# Patient Record
Sex: MALE | Race: WHITE | Employment: OTHER | URBAN - METROPOLITAN AREA
[De-identification: names, ages, dates, MRNs, and addresses within clinical notes are randomized per-mention and may not be internally consistent; named-entity substitution may affect disease eponyms.]

---

## 2020-07-11 ENCOUNTER — TRANSFERRED RECORDS (OUTPATIENT)
Dept: HEALTH INFORMATION MANAGEMENT | Facility: CLINIC | Age: 70
End: 2020-07-11

## 2020-07-14 ENCOUNTER — TRANSFERRED RECORDS (OUTPATIENT)
Dept: HEALTH INFORMATION MANAGEMENT | Facility: CLINIC | Age: 70
End: 2020-07-14

## 2020-07-15 ENCOUNTER — TRANSFERRED RECORDS (OUTPATIENT)
Dept: HEALTH INFORMATION MANAGEMENT | Facility: CLINIC | Age: 70
End: 2020-07-15

## 2020-07-16 ENCOUNTER — TRANSFERRED RECORDS (OUTPATIENT)
Dept: HEALTH INFORMATION MANAGEMENT | Facility: CLINIC | Age: 70
End: 2020-07-16

## 2020-07-24 ENCOUNTER — TRANSFERRED RECORDS (OUTPATIENT)
Dept: HEALTH INFORMATION MANAGEMENT | Facility: CLINIC | Age: 70
End: 2020-07-24

## 2020-08-27 ENCOUNTER — TRANSFERRED RECORDS (OUTPATIENT)
Dept: HEALTH INFORMATION MANAGEMENT | Facility: CLINIC | Age: 70
End: 2020-08-27

## 2020-09-14 ENCOUNTER — TRANSFERRED RECORDS (OUTPATIENT)
Dept: HEALTH INFORMATION MANAGEMENT | Facility: CLINIC | Age: 70
End: 2020-09-14

## 2020-09-16 ENCOUNTER — MEDICAL CORRESPONDENCE (OUTPATIENT)
Dept: HEALTH INFORMATION MANAGEMENT | Facility: CLINIC | Age: 70
End: 2020-09-16

## 2020-09-17 ENCOUNTER — TRANSCRIBE ORDERS (OUTPATIENT)
Dept: OTHER | Age: 70
End: 2020-09-17

## 2020-09-17 ENCOUNTER — TELEPHONE (OUTPATIENT)
Dept: GASTROENTEROLOGY | Facility: CLINIC | Age: 70
End: 2020-09-17

## 2020-09-17 DIAGNOSIS — K80.50 BILE DUCT STONE: Primary | ICD-10-CM

## 2020-09-17 NOTE — TELEPHONE ENCOUNTER
[facility] Intake form:    Referring/Requesting provider and Health care System:  Dr Jose Angel Rouse at [facility]    GI provider pt seen for initial consult.   Clinic contact - Name, Phone and Fax number: : [phone] Fax [phone]    Requested provider (if specified):   Dr. Chavez    Has patient been evaluated in clinic or had a procedure [facility] provider in the last 5 years: unknown     Indication/Diagnosis for consultation:   Bile Duct /Repeat ERCP    Has patient been evaluated by another Gastroenterologist? Unknown     Imaging completed:     CT scan     unknown  MRI     unknown      Procedures:     Upper Endoscopy/EGD unknown    Endoscopic Ultrasound/EUS unknown    ERCP  Yes    Colonoscopy unknown    Are images able/being pushed to our system? Yes   Is patient aware of request for clinc consultation and ok to be contacted to schedule? Yes    STAT

## 2020-09-18 NOTE — TELEPHONE ENCOUNTER
Records and images not in pt's chart.   Returned call to Dr Jose Angel Rouse at [facility]   Medical records sending ERCP operative report and related images  Will be faxed attn: Dr Chavez  Images being pushed

## 2020-09-24 NOTE — TELEPHONE ENCOUNTER
Per Dr. Garrido    Please schedule ERCP with digital spyglass in 2-3 weeks when I am off service.    Please assist in scheduling:     Procedure/Imaging/Clinic: ERCP  Physician: Dr. Garrido  Timin-3 weeks  Procedure length:90 min  Anesthesia:gen  Dx: cbd stone  Tier:2  Location: [facility]     Talked to house Elina Moses, House Supervisor. Pt lives in group home, does not have POA.     Called to discuss with patient. Explained they can expect a call for date and time for procedure, will need a , someone to stay with them for 24 hours and should stay in town for 24 hours (within 45 min of Hospital) post procedure    Patient needs to get pre-op physical completed and will fax a copy to us along with bringing a hard copy with them. Fax number given. [phone] *If you do not get a preop physical, your procedure could be cancelled, patient voiced understanding*    Preop Plan: PCP will do, PCP updated    Med Review    Blood thinner -  no  ASA - daily ASA, can continue  Diabetic - no    COVID test discussed: need 4 days prior    Patient Education r/t procedure: no questions    A pre-op nurse will call 1-2 days prior to the procedure. Is advised to be NPO/no solid food 8 hours before the procedure. Ok to drink clear liquids (Water, Apple Juice or Gatorade) up to 2 hours prior to procedure.     Other specific details/comments:     Verbalized understanding of all instructions. All questions answered.

## 2020-09-24 NOTE — TELEPHONE ENCOUNTER
[facility]     Referring provider:  Rubén Caruso    Referred to: [facility]     Provider Requested:  NA      Referral Received: 9/17/2020     Records received:  9/23/2020     Images received: none    Evaluation for: ERCP w/ Spy     Clinical History (per RN review):     ERCP July 11, 2020, ERCP completed July 11, 2020, for jaundice elevated liver enzymes, suspected asending cholangitis.  ERCP note does note patient was in ICU at time of ERCP    Follow-up ERCP completed on July 16, 2020, for bile duct stone therapy, noted failure to remove CBD stone with balloon sweeps mechanical lithotripsy, stent placed, planned repeat ERCP in 6 weeks to remove stent and remaining stone with Dr. Mccormack.    ERCP 9/14/2020 again failed to remove stone:  Recommendation: Arrange for referral to Dr. Chris Chavez's group at the [facility] to consider spyglass for definitive management of suspected stone versus polypoid lesion in the bile duct.      Admitted to the hospital in August 2020 with ascending cholangitis secondary to choledocholithiasis.   Gallbladder removal in August 2020, op note from lap geno indicates findings of inflamed gallbladder, dense adhesions, pus, unable to safely locate cystic duct and artery secondary to significant woody inflammation.      ERCP completed September 14, 2020  (all ERCP reads scanned into Media tab)    Impression    1 stent from common bile duct was seen in the major papilla    Prior biliary sphincterotomy appeared open    A filling defect consistent with a stone was seen in the cholangiogram    Examination was suspicious for choledocholithiasis    1 stent was removed from the common bile duct    Recommendation: Arrange for referral to Dr. Chris Chavez's group at the [facility] to consider spyglass for definitive management of suspected stone versus polypoid lesion in the bile duct.                MD review date: 9/24/2020  MD  Decision for clinic consultation/Orders:            Referral updates/Patient contacted:

## 2020-09-28 ENCOUNTER — PREP FOR PROCEDURE (OUTPATIENT)
Dept: GASTROENTEROLOGY | Facility: CLINIC | Age: 70
End: 2020-09-28

## 2020-09-28 DIAGNOSIS — K80.50 CHOLEDOCHOLITHIASIS: Primary | ICD-10-CM

## 2020-09-29 ENCOUNTER — HOSPITAL ENCOUNTER (OUTPATIENT)
Facility: CLINIC | Age: 70
End: 2020-09-29
Attending: INTERNAL MEDICINE | Admitting: INTERNAL MEDICINE
Payer: MEDICARE

## 2020-09-29 DIAGNOSIS — K80.50 CHOLEDOCHOLITHIASIS: ICD-10-CM

## 2020-09-29 DIAGNOSIS — Z11.59 ENCOUNTER FOR SCREENING FOR OTHER VIRAL DISEASES: Primary | ICD-10-CM

## 2020-10-02 ENCOUNTER — TELEPHONE (OUTPATIENT)
Dept: GASTROENTEROLOGY | Facility: CLINIC | Age: 70
End: 2020-10-02

## 2020-10-05 ENCOUNTER — TELEPHONE (OUTPATIENT)
Dept: GASTROENTEROLOGY | Facility: CLINIC | Age: 70
End: 2020-10-05

## 2020-10-05 NOTE — TELEPHONE ENCOUNTER
Spoke to patient's  Elina per message received from Elana. Informed Elina the patient is scheduled with Dr. Floyd on 10/14/2020. Elina stated that someone in the patient's house tested positive for covid so the house is in quarantine until 10/15/2020. She stated she would like the patient to be scheduled on 10/21/2020. Informed her the patient will need an updated pre-op physical within 30 days of his procedure. She stated the patient is going to have this done locally along with covid test within 96-72 hours of procedure date. Informed Elina the patient  will need a  and someone to monitor him for 24 hours after the procedure. Informed Elina all scheduling details will be sent to the address listed on Epic. Address confirmed on this call.

## 2020-10-14 ENCOUNTER — TELEPHONE (OUTPATIENT)
Dept: GASTROENTEROLOGY | Facility: CLINIC | Age: 70
End: 2020-10-14

## 2020-10-14 RX ORDER — INDOMETHACIN 50 MG/1
100 SUPPOSITORY RECTAL
Status: CANCELLED | OUTPATIENT
Start: 2020-10-14

## 2020-10-14 RX ORDER — LIDOCAINE 40 MG/G
CREAM TOPICAL
Status: CANCELLED | OUTPATIENT
Start: 2020-10-14

## 2020-10-14 NOTE — TELEPHONE ENCOUNTER
[facility] Call Center    Phone Message    May a detailed message be left on voicemail: yes     Reason for Call: Order(s): Other:   Reason for requested: Covid Test  Date needed: ASAP  Provider name: Dr. Garrido   Please fax the order to [phone]      Action Taken: Message routed to:  [facility]    Travel Screening: Not Applicable

## 2020-10-14 NOTE — TELEPHONE ENCOUNTER
(Dr. Garrido's order) Covid Test order faxed to [phone], per request.     Lane Lantigua LPN

## 2020-10-19 SDOH — HEALTH STABILITY: MENTAL HEALTH: HOW OFTEN DO YOU HAVE A DRINK CONTAINING ALCOHOL?: NEVER

## 2020-10-20 ENCOUNTER — TELEPHONE (OUTPATIENT)
Dept: GASTROENTEROLOGY | Facility: CLINIC | Age: 70
End: 2020-10-20

## 2020-10-20 RX ORDER — LITHIUM CARBONATE 300 MG/1
300 TABLET, FILM COATED, EXTENDED RELEASE ORAL AT BEDTIME
COMMUNITY
End: 2020-10-21 | Stop reason: HOSPADM

## 2020-10-20 RX ORDER — CARVEDILOL 6.25 MG/1
6.25 TABLET ORAL 2 TIMES DAILY WITH MEALS
COMMUNITY
End: 2020-10-21 | Stop reason: HOSPADM

## 2020-10-20 RX ORDER — LEVOTHYROXINE SODIUM 25 UG/1
25 TABLET ORAL DAILY
COMMUNITY
End: 2020-10-21 | Stop reason: HOSPADM

## 2020-10-20 RX ORDER — POTASSIUM CHLORIDE 750 MG/1
10 CAPSULE, EXTENDED RELEASE ORAL DAILY
COMMUNITY
End: 2020-10-21 | Stop reason: HOSPADM

## 2020-10-20 RX ORDER — FAMOTIDINE 20 MG/1
20 TABLET, FILM COATED ORAL 2 TIMES DAILY
COMMUNITY
End: 2020-10-21 | Stop reason: HOSPADM

## 2020-10-20 RX ORDER — CITALOPRAM HYDROBROMIDE 40 MG/1
40 TABLET ORAL DAILY
COMMUNITY
End: 2020-10-21 | Stop reason: HOSPADM

## 2020-10-20 RX ORDER — DOCUSATE SODIUM 100 MG/1
100 CAPSULE, LIQUID FILLED ORAL DAILY
COMMUNITY
End: 2020-10-21 | Stop reason: HOSPADM

## 2020-10-20 RX ORDER — BUPROPION HYDROCHLORIDE 150 MG/1
150 TABLET ORAL EVERY MORNING
COMMUNITY
End: 2020-10-21 | Stop reason: HOSPADM

## 2020-10-20 RX ORDER — VITAMIN E 268 MG
CAPSULE ORAL
COMMUNITY
End: 2020-10-21 | Stop reason: HOSPADM

## 2020-10-20 RX ORDER — FESOTERODINE FUMARATE 4 MG/1
4 TABLET, FILM COATED, EXTENDED RELEASE ORAL DAILY
COMMUNITY
End: 2020-10-21 | Stop reason: HOSPADM

## 2020-10-20 RX ORDER — FUROSEMIDE 40 MG
40 TABLET ORAL DAILY
COMMUNITY
End: 2020-10-21 | Stop reason: HOSPADM

## 2020-10-20 RX ORDER — ARIPIPRAZOLE 5 MG/1
5 TABLET ORAL 2 TIMES DAILY
COMMUNITY
End: 2020-10-21 | Stop reason: HOSPADM

## 2020-10-20 RX ORDER — TRAZODONE HYDROCHLORIDE 50 MG/1
50 TABLET, FILM COATED ORAL AT BEDTIME
COMMUNITY
End: 2020-10-21 | Stop reason: HOSPADM

## 2020-10-20 RX ORDER — TRAMADOL HYDROCHLORIDE 50 MG/1
50 TABLET ORAL EVERY 6 HOURS PRN
COMMUNITY
End: 2020-10-21 | Stop reason: HOSPADM

## 2020-10-20 RX ORDER — ASPIRIN 81 MG/1
162 TABLET ORAL DAILY
COMMUNITY
End: 2020-10-21 | Stop reason: HOSPADM

## 2020-10-20 RX ORDER — NITROGLYCERIN 0.4 MG/1
0.4 TABLET SUBLINGUAL EVERY 5 MIN PRN
COMMUNITY
End: 2020-10-21 | Stop reason: HOSPADM

## 2020-10-20 NOTE — TELEPHONE ENCOUNTER
Called to discuss cancellation of procedure, patient is unable to get a ride.     Jewels peralta call back on Friday as she'll be out for her father in laws .    ML

## 2020-10-20 NOTE — TELEPHONE ENCOUNTER
[facility] Call Center    Phone Message    May a detailed message be left on voicemail: yes     Reason for Call: Other: Jewels at [facility] called as due to some circumstances, they need to reschedule the 10/21/20 ERCP with Dr. Floyd.  Please call Jewels at [phone].  Thank you!     Action Taken: Message routed to:  [facility] Jossie [facility] Team UC    Travel Screening: Not Applicable

## 2020-10-27 NOTE — TELEPHONE ENCOUNTER
[facility] Call Center    Phone Message    May a detailed message be left on voicemail: yes     Reason for Call: Other: Jewels called back and is still waiting to reschedule the procedure for this patient.      Action Taken: Message routed to:  [facility] AE    Travel Screening: Not Applicable

## 2020-10-28 ENCOUNTER — TELEPHONE (OUTPATIENT)
Dept: GASTROENTEROLOGY | Facility: CLINIC | Age: 70
End: 2020-10-28

## 2020-10-28 NOTE — TELEPHONE ENCOUNTER
LVM for patient's EC Jewels in regards to rescheduling procedure with Dr. Floyd. Left direct line for patient to call to go over scheduling details.

## 2020-10-30 ENCOUNTER — TELEPHONE (OUTPATIENT)
Dept: GASTROENTEROLOGY | Facility: CLINIC | Age: 70
End: 2020-10-30

## 2020-10-30 ENCOUNTER — DOCUMENTATION ONLY (OUTPATIENT)
Dept: GASTROENTEROLOGY | Facility: CLINIC | Age: 70
End: 2020-10-30

## 2020-10-30 ENCOUNTER — PREP FOR PROCEDURE (OUTPATIENT)
Dept: GASTROENTEROLOGY | Facility: CLINIC | Age: 70
End: 2020-10-30

## 2020-10-30 DIAGNOSIS — K80.50 CHOLEDOCHOLITHIASIS: Primary | ICD-10-CM

## 2020-10-30 DIAGNOSIS — Z11.59 ENCOUNTER FOR SCREENING FOR OTHER VIRAL DISEASES: Primary | ICD-10-CM

## 2020-10-30 NOTE — PROGRESS NOTES
Per request (Elana HAN RN)    Covid Test order faxed to PCP at [phone].    Lane Lantigua LPN

## 2020-11-09 ENCOUNTER — PATIENT OUTREACH (OUTPATIENT)
Dept: GASTROENTEROLOGY | Facility: CLINIC | Age: 70
End: 2020-11-09

## 2020-11-09 NOTE — TELEPHONE ENCOUNTER
Called group home to discuss covid testing protocol. Tested positive on 11/1, had fever at beginning, was in hospital did require some O2 on first day. Was bradycardic in the 30s during hospital, normal 50-60.  No fevers since first day of diagnosis and back to respiratory  Baseline.    Advised no additional covid test needed. Patient is not immunocompromised    Proceed as negative if:    14 days have passed since symptom onset AND    At least 72 hours fever free without the use of  fever reducing medications AND    Substantial improvement in symptoms of COVID19 as judged by the primary provider caring for  the patient (e.g. return to prior baseline oxygen  requirement)  If patient is immunocompromised or still has a COVID    ML

## 2020-11-12 ENCOUNTER — PATIENT OUTREACH (OUTPATIENT)
Dept: GASTROENTEROLOGY | Facility: CLINIC | Age: 70
End: 2020-11-12

## 2020-11-12 NOTE — TELEPHONE ENCOUNTER
Called group home to discuss preop, will see PCP, today, for reassessment and preop physical.    ML

## 2020-11-12 NOTE — TELEPHONE ENCOUNTER
Called back, had virtual f/ up with MD today, will get labs drawn and faxed over. Pt not jaundice, no fever. They will fax labs once available.     ML

## 2020-11-12 NOTE — TELEPHONE ENCOUNTER
After further discussion with Dr. Garrido, patients procedure likely to be cancelled and delayed for a month unless acute/life threatening issues. Pt also lives in congregate living/group home. Called Jewels at group home again, left message.    ML

## 2020-11-13 ENCOUNTER — PATIENT OUTREACH (OUTPATIENT)
Dept: GASTROENTEROLOGY | Facility: CLINIC | Age: 70
End: 2020-11-13

## 2020-11-13 NOTE — TELEPHONE ENCOUNTER
Called Jewels to advise labs not received yet. Will delay procedure for now and advise of new date.  Reminded for need of preop within 30 days, but no covid test unless >90 days since diagnosis.     ML

## 2020-12-03 ENCOUNTER — TELEPHONE (OUTPATIENT)
Dept: GASTROENTEROLOGY | Facility: CLINIC | Age: 70
End: 2020-12-03

## 2020-12-03 NOTE — TELEPHONE ENCOUNTER
Spoke to patient's Care Taker Jewels in regards to scheduled procedure. Informed her we need to get the patient rescheduled for procedure with Dr. Garrido. Jewels stated they would like to get scheduled on 12/21/2020. Informed Jewels the patient is back on with Dr. Floyd on 12/21/2020. Informed her the patient will need an updated pre-op physical within 30 days of his procedure and a COVID-19 test within 96-72 hours of procedure date. She stated the patient is going to have this done locally. Informed her the patient will need a  and someone to monitor him for 24 hours after the procedure. Informed her all scheduling details will be sent to the address listed on Epic. Address confirmed on this call.

## 2020-12-06 DIAGNOSIS — Z11.59 ENCOUNTER FOR SCREENING FOR OTHER VIRAL DISEASES: Primary | ICD-10-CM

## 2020-12-17 RX ORDER — ASPIRIN 81 MG
100 TABLET, DELAYED RELEASE (ENTERIC COATED) ORAL DAILY
COMMUNITY

## 2020-12-17 RX ORDER — ATORVASTATIN CALCIUM 40 MG/1
40 TABLET, FILM COATED ORAL DAILY
COMMUNITY

## 2020-12-17 RX ORDER — FAMOTIDINE 20 MG/1
20 TABLET, FILM COATED ORAL 2 TIMES DAILY
COMMUNITY

## 2020-12-17 RX ORDER — BUPROPION HYDROCHLORIDE 150 MG/1
150 TABLET, EXTENDED RELEASE ORAL DAILY
COMMUNITY

## 2020-12-17 RX ORDER — TRAZODONE HYDROCHLORIDE 50 MG/1
50 TABLET, FILM COATED ORAL AT BEDTIME
COMMUNITY

## 2020-12-17 RX ORDER — ARIPIPRAZOLE 10 MG/1
10 TABLET ORAL DAILY
COMMUNITY

## 2020-12-17 RX ORDER — LITHIUM CARBONATE 300 MG
300 TABLET ORAL AT BEDTIME
COMMUNITY

## 2020-12-17 RX ORDER — TAMSULOSIN HYDROCHLORIDE 0.4 MG/1
0.4 CAPSULE ORAL AT BEDTIME
COMMUNITY

## 2020-12-17 RX ORDER — LEVOTHYROXINE SODIUM 25 UG/1
25 TABLET ORAL DAILY
COMMUNITY

## 2020-12-17 RX ORDER — ASPIRIN 81 MG/1
81 TABLET ORAL DAILY
COMMUNITY

## 2020-12-17 RX ORDER — POTASSIUM CHLORIDE 750 MG/1
TABLET, EXTENDED RELEASE ORAL DAILY
COMMUNITY

## 2020-12-17 RX ORDER — CARVEDILOL 3.12 MG/1
3.12 TABLET ORAL 2 TIMES DAILY WITH MEALS
COMMUNITY

## 2020-12-17 RX ORDER — CITALOPRAM HYDROBROMIDE 40 MG/1
40 TABLET ORAL DAILY
COMMUNITY

## 2020-12-18 ENCOUNTER — PATIENT OUTREACH (OUTPATIENT)
Dept: GASTROENTEROLOGY | Facility: CLINIC | Age: 70
End: 2020-12-18

## 2020-12-18 NOTE — TELEPHONE ENCOUNTER
Returned call to Jewels to answer questions.    Reviewed recommendation to stay in town for 24 hours. Confirmed times.     ML

## 2020-12-21 ENCOUNTER — HOSPITAL ENCOUNTER (OUTPATIENT)
Facility: CLINIC | Age: 70
Discharge: HOME | End: 2020-12-21
Attending: INTERNAL MEDICINE | Admitting: INTERNAL MEDICINE
Payer: MEDICARE

## 2020-12-21 ENCOUNTER — ANESTHESIA EVENT (OUTPATIENT)
Dept: SURGERY | Facility: CLINIC | Age: 70
End: 2020-12-21
Payer: MEDICARE

## 2020-12-21 ENCOUNTER — ANESTHESIA (OUTPATIENT)
Dept: SURGERY | Facility: CLINIC | Age: 70
End: 2020-12-21
Payer: MEDICARE

## 2020-12-21 ENCOUNTER — APPOINTMENT (OUTPATIENT)
Dept: GENERAL RADIOLOGY | Facility: CLINIC | Age: 70
End: 2020-12-21
Attending: INTERNAL MEDICINE
Payer: MEDICARE

## 2020-12-21 VITALS
TEMPERATURE: 98.7 F | BODY MASS INDEX: 30.87 KG/M2 | WEIGHT: 240.52 LBS | HEIGHT: 74 IN | RESPIRATION RATE: 22 BRPM | OXYGEN SATURATION: 97 % | HEART RATE: 60 BPM | DIASTOLIC BLOOD PRESSURE: 69 MMHG | SYSTOLIC BLOOD PRESSURE: 122 MMHG

## 2020-12-21 DIAGNOSIS — K80.50 CHOLEDOCHOLITHIASIS: ICD-10-CM

## 2020-12-21 LAB
ALBUMIN SERPL-MCNC: 3.7 G/DL (ref 3.4–5)
ALP SERPL-CCNC: 143 U/L (ref 40–150)
ALT SERPL W P-5'-P-CCNC: 23 U/L (ref 0–70)
AMYLASE SERPL-CCNC: 64 U/L (ref 30–110)
ANION GAP SERPL CALCULATED.3IONS-SCNC: 4 MMOL/L (ref 3–14)
AST SERPL W P-5'-P-CCNC: 21 U/L (ref 0–45)
BILIRUB SERPL-MCNC: 0.7 MG/DL (ref 0.2–1.3)
BUN SERPL-MCNC: 24 MG/DL (ref 7–30)
CALCIUM SERPL-MCNC: 9.6 MG/DL (ref 8.5–10.1)
CHLORIDE SERPL-SCNC: 111 MMOL/L (ref 94–109)
CO2 SERPL-SCNC: 27 MMOL/L (ref 20–32)
CREAT SERPL-MCNC: 1.68 MG/DL (ref 0.66–1.25)
ERYTHROCYTE [DISTWIDTH] IN BLOOD BY AUTOMATED COUNT: 16.1 % (ref 10–15)
GFR SERPL CREATININE-BSD FRML MDRD: 40 ML/MIN/{1.73_M2}
GLUCOSE BLDC GLUCOMTR-MCNC: 92 MG/DL (ref 70–99)
GLUCOSE SERPL-MCNC: 102 MG/DL (ref 70–99)
HCT VFR BLD AUTO: 46.3 % (ref 40–53)
HGB BLD-MCNC: 14.2 G/DL (ref 13.3–17.7)
INR PPP: 1.15 (ref 0.86–1.14)
LIPASE SERPL-CCNC: 206 U/L (ref 73–393)
MCH RBC QN AUTO: 27.7 PG (ref 26.5–33)
MCHC RBC AUTO-ENTMCNC: 30.7 G/DL (ref 31.5–36.5)
MCV RBC AUTO: 90 FL (ref 78–100)
PLATELET # BLD AUTO: 248 10E9/L (ref 150–450)
POTASSIUM SERPL-SCNC: 4 MMOL/L (ref 3.4–5.3)
PROT SERPL-MCNC: 7.9 G/DL (ref 6.8–8.8)
RBC # BLD AUTO: 5.13 10E12/L (ref 4.4–5.9)
SODIUM SERPL-SCNC: 142 MMOL/L (ref 133–144)
WBC # BLD AUTO: 8.9 10E9/L (ref 4–11)

## 2020-12-21 PROCEDURE — C1877 STENT, NON-COAT/COV W/O DEL: HCPCS | Performed by: INTERNAL MEDICINE

## 2020-12-21 PROCEDURE — 250N000009 HC RX 250: Performed by: DENTIST

## 2020-12-21 PROCEDURE — 250N000009 HC RX 250: Performed by: NURSE ANESTHETIST, CERTIFIED REGISTERED

## 2020-12-21 PROCEDURE — 80053 COMPREHEN METABOLIC PANEL: CPT | Performed by: STUDENT IN AN ORGANIZED HEALTH CARE EDUCATION/TRAINING PROGRAM

## 2020-12-21 PROCEDURE — 255N000002 HC RX 255 OP 636: Performed by: INTERNAL MEDICINE

## 2020-12-21 PROCEDURE — 82150 ASSAY OF AMYLASE: CPT | Performed by: STUDENT IN AN ORGANIZED HEALTH CARE EDUCATION/TRAINING PROGRAM

## 2020-12-21 PROCEDURE — 370N000002 HC ANESTHESIA TECHNICAL FEE, EACH ADDTL 15 MIN: Performed by: INTERNAL MEDICINE

## 2020-12-21 PROCEDURE — 999N000139 HC STATISTIC PRE-PROCEDURE ASSESSMENT II: Performed by: INTERNAL MEDICINE

## 2020-12-21 PROCEDURE — 370N000001 HC ANESTHESIA TECHNICAL FEE, 1ST 30 MIN: Performed by: INTERNAL MEDICINE

## 2020-12-21 PROCEDURE — 360N000023 HC SURGERY LEVEL 3 EA 15 ADDTL MIN UMMC: Performed by: INTERNAL MEDICINE

## 2020-12-21 PROCEDURE — 360N000025 HC SURGERY LEVEL 3 W FLUORO 1ST 30 MIN - UMMC: Performed by: INTERNAL MEDICINE

## 2020-12-21 PROCEDURE — 761N000007 HC RECOVERY PHASE 2 EACH 15 MINS: Performed by: INTERNAL MEDICINE

## 2020-12-21 PROCEDURE — C1769 GUIDE WIRE: HCPCS | Performed by: INTERNAL MEDICINE

## 2020-12-21 PROCEDURE — 85610 PROTHROMBIN TIME: CPT | Performed by: STUDENT IN AN ORGANIZED HEALTH CARE EDUCATION/TRAINING PROGRAM

## 2020-12-21 PROCEDURE — 43273 ENDOSCOPIC PANCREATOSCOPY: CPT | Performed by: INTERNAL MEDICINE

## 2020-12-21 PROCEDURE — 83690 ASSAY OF LIPASE: CPT | Performed by: STUDENT IN AN ORGANIZED HEALTH CARE EDUCATION/TRAINING PROGRAM

## 2020-12-21 PROCEDURE — 250N000011 HC RX IP 250 OP 636: Performed by: NURSE ANESTHETIST, CERTIFIED REGISTERED

## 2020-12-21 PROCEDURE — 272N000001 HC OR GENERAL SUPPLY STERILE: Performed by: INTERNAL MEDICINE

## 2020-12-21 PROCEDURE — 43265 ERCP LITHOTRIPSY CALCULI: CPT | Mod: 51 | Performed by: INTERNAL MEDICINE

## 2020-12-21 PROCEDURE — 250N000011 HC RX IP 250 OP 636: Performed by: DENTIST

## 2020-12-21 PROCEDURE — 761N000003 HC RECOVERY PHASE 1 LEVEL 2 FIRST HR: Performed by: INTERNAL MEDICINE

## 2020-12-21 PROCEDURE — 74328 X-RAY BILE DUCT ENDOSCOPY: CPT | Mod: 26 | Performed by: INTERNAL MEDICINE

## 2020-12-21 PROCEDURE — 258N000003 HC RX IP 258 OP 636: Performed by: DENTIST

## 2020-12-21 PROCEDURE — 36415 COLL VENOUS BLD VENIPUNCTURE: CPT | Performed by: STUDENT IN AN ORGANIZED HEALTH CARE EDUCATION/TRAINING PROGRAM

## 2020-12-21 PROCEDURE — 43276 ERCP STENT EXCHANGE W/DILATE: CPT | Performed by: INTERNAL MEDICINE

## 2020-12-21 PROCEDURE — 999N000181 XR SURGERY CARM FLUORO GREATER THAN 5 MIN W STILLS: Mod: TC

## 2020-12-21 PROCEDURE — 250N000003 HC SEVOFLURANE, EA 15 MIN: Performed by: INTERNAL MEDICINE

## 2020-12-21 PROCEDURE — C1726 CATH, BAL DIL, NON-VASCULAR: HCPCS | Performed by: INTERNAL MEDICINE

## 2020-12-21 PROCEDURE — 85027 COMPLETE CBC AUTOMATED: CPT | Performed by: STUDENT IN AN ORGANIZED HEALTH CARE EDUCATION/TRAINING PROGRAM

## 2020-12-21 PROCEDURE — 258N000003 HC RX IP 258 OP 636: Performed by: NURSE ANESTHETIST, CERTIFIED REGISTERED

## 2020-12-21 PROCEDURE — 250N000009 HC RX 250: Performed by: INTERNAL MEDICINE

## 2020-12-21 PROCEDURE — 999N001017 HC STATISTIC GLUCOSE BY METER IP

## 2020-12-21 DEVICE — IMPLANTABLE DEVICE: Type: IMPLANTABLE DEVICE | Site: BILE DUCT | Status: FUNCTIONAL

## 2020-12-21 RX ORDER — HYDROMORPHONE HYDROCHLORIDE 1 MG/ML
.3-.5 INJECTION, SOLUTION INTRAMUSCULAR; INTRAVENOUS; SUBCUTANEOUS EVERY 10 MIN PRN
Status: DISCONTINUED | OUTPATIENT
Start: 2020-12-21 | End: 2020-12-21 | Stop reason: HOSPADM

## 2020-12-21 RX ORDER — ONDANSETRON 2 MG/ML
4 INJECTION INTRAMUSCULAR; INTRAVENOUS EVERY 30 MIN PRN
Status: DISCONTINUED | OUTPATIENT
Start: 2020-12-21 | End: 2020-12-21 | Stop reason: HOSPADM

## 2020-12-21 RX ORDER — FENTANYL CITRATE 50 UG/ML
INJECTION, SOLUTION INTRAMUSCULAR; INTRAVENOUS PRN
Status: DISCONTINUED | OUTPATIENT
Start: 2020-12-21 | End: 2020-12-21

## 2020-12-21 RX ORDER — LEVOFLOXACIN 5 MG/ML
INJECTION, SOLUTION INTRAVENOUS PRN
Status: DISCONTINUED | OUTPATIENT
Start: 2020-12-21 | End: 2020-12-21

## 2020-12-21 RX ORDER — NALOXONE HYDROCHLORIDE 0.4 MG/ML
0.2 INJECTION, SOLUTION INTRAMUSCULAR; INTRAVENOUS; SUBCUTANEOUS
Status: DISCONTINUED | OUTPATIENT
Start: 2020-12-21 | End: 2020-12-21 | Stop reason: HOSPADM

## 2020-12-21 RX ORDER — ALBUTEROL SULFATE 0.83 MG/ML
2.5 SOLUTION RESPIRATORY (INHALATION) EVERY 4 HOURS PRN
Status: DISCONTINUED | OUTPATIENT
Start: 2020-12-21 | End: 2020-12-21 | Stop reason: HOSPADM

## 2020-12-21 RX ORDER — IOPAMIDOL 510 MG/ML
INJECTION, SOLUTION INTRAVASCULAR PRN
Status: DISCONTINUED | OUTPATIENT
Start: 2020-12-21 | End: 2020-12-21 | Stop reason: HOSPADM

## 2020-12-21 RX ORDER — LEVOFLOXACIN 500 MG/1
500 TABLET, FILM COATED ORAL DAILY
Qty: 3 TABLET | Refills: 0 | Status: SHIPPED | OUTPATIENT
Start: 2020-12-21 | End: 2020-12-24

## 2020-12-21 RX ORDER — ONDANSETRON 4 MG/1
4 TABLET, ORALLY DISINTEGRATING ORAL EVERY 30 MIN PRN
Status: DISCONTINUED | OUTPATIENT
Start: 2020-12-21 | End: 2020-12-21 | Stop reason: HOSPADM

## 2020-12-21 RX ORDER — HYDRALAZINE HYDROCHLORIDE 20 MG/ML
2.5-5 INJECTION INTRAMUSCULAR; INTRAVENOUS EVERY 10 MIN PRN
Status: DISCONTINUED | OUTPATIENT
Start: 2020-12-21 | End: 2020-12-21 | Stop reason: HOSPADM

## 2020-12-21 RX ORDER — NALOXONE HYDROCHLORIDE 0.4 MG/ML
0.4 INJECTION, SOLUTION INTRAMUSCULAR; INTRAVENOUS; SUBCUTANEOUS
Status: DISCONTINUED | OUTPATIENT
Start: 2020-12-21 | End: 2020-12-21 | Stop reason: HOSPADM

## 2020-12-21 RX ORDER — EPHEDRINE SULFATE 50 MG/ML
INJECTION, SOLUTION INTRAMUSCULAR; INTRAVENOUS; SUBCUTANEOUS PRN
Status: DISCONTINUED | OUTPATIENT
Start: 2020-12-21 | End: 2020-12-21

## 2020-12-21 RX ORDER — DEXAMETHASONE SODIUM PHOSPHATE 4 MG/ML
INJECTION, SOLUTION INTRA-ARTICULAR; INTRALESIONAL; INTRAMUSCULAR; INTRAVENOUS; SOFT TISSUE PRN
Status: DISCONTINUED | OUTPATIENT
Start: 2020-12-21 | End: 2020-12-21

## 2020-12-21 RX ORDER — FENTANYL CITRATE 50 UG/ML
25-50 INJECTION, SOLUTION INTRAMUSCULAR; INTRAVENOUS
Status: DISCONTINUED | OUTPATIENT
Start: 2020-12-21 | End: 2020-12-21 | Stop reason: HOSPADM

## 2020-12-21 RX ORDER — SODIUM CHLORIDE, SODIUM LACTATE, POTASSIUM CHLORIDE, CALCIUM CHLORIDE 600; 310; 30; 20 MG/100ML; MG/100ML; MG/100ML; MG/100ML
INJECTION, SOLUTION INTRAVENOUS CONTINUOUS
Status: DISCONTINUED | OUTPATIENT
Start: 2020-12-21 | End: 2020-12-21 | Stop reason: HOSPADM

## 2020-12-21 RX ORDER — SODIUM CHLORIDE, SODIUM LACTATE, POTASSIUM CHLORIDE, CALCIUM CHLORIDE 600; 310; 30; 20 MG/100ML; MG/100ML; MG/100ML; MG/100ML
INJECTION, SOLUTION INTRAVENOUS CONTINUOUS PRN
Status: DISCONTINUED | OUTPATIENT
Start: 2020-12-21 | End: 2020-12-21

## 2020-12-21 RX ORDER — PROPOFOL 10 MG/ML
INJECTION, EMULSION INTRAVENOUS PRN
Status: DISCONTINUED | OUTPATIENT
Start: 2020-12-21 | End: 2020-12-21

## 2020-12-21 RX ORDER — LABETALOL HYDROCHLORIDE 5 MG/ML
10 INJECTION, SOLUTION INTRAVENOUS
Status: DISCONTINUED | OUTPATIENT
Start: 2020-12-21 | End: 2020-12-21 | Stop reason: HOSPADM

## 2020-12-21 RX ORDER — FLUMAZENIL 0.1 MG/ML
0.2 INJECTION, SOLUTION INTRAVENOUS
Status: DISCONTINUED | OUTPATIENT
Start: 2020-12-21 | End: 2020-12-21 | Stop reason: HOSPADM

## 2020-12-21 RX ORDER — LIDOCAINE HYDROCHLORIDE 20 MG/ML
INJECTION, SOLUTION INFILTRATION; PERINEURAL PRN
Status: DISCONTINUED | OUTPATIENT
Start: 2020-12-21 | End: 2020-12-21

## 2020-12-21 RX ORDER — ONDANSETRON 2 MG/ML
INJECTION INTRAMUSCULAR; INTRAVENOUS PRN
Status: DISCONTINUED | OUTPATIENT
Start: 2020-12-21 | End: 2020-12-21

## 2020-12-21 RX ORDER — INDOMETHACIN 50 MG/1
100 SUPPOSITORY RECTAL
Status: DISCONTINUED | OUTPATIENT
Start: 2020-12-21 | End: 2020-12-21 | Stop reason: HOSPADM

## 2020-12-21 RX ORDER — LIDOCAINE 40 MG/G
CREAM TOPICAL
Status: DISCONTINUED | OUTPATIENT
Start: 2020-12-21 | End: 2020-12-21 | Stop reason: HOSPADM

## 2020-12-21 RX ADMIN — NOREPINEPHRINE BITARTRATE 12.8 MCG: 1 INJECTION, SOLUTION, CONCENTRATE INTRAVENOUS at 16:19

## 2020-12-21 RX ADMIN — Medication 10 MG: at 16:06

## 2020-12-21 RX ADMIN — ROCURONIUM BROMIDE 50 MG: 10 INJECTION INTRAVENOUS at 15:32

## 2020-12-21 RX ADMIN — Medication 15 MG: at 15:46

## 2020-12-21 RX ADMIN — DEXAMETHASONE SODIUM PHOSPHATE 4 MG: 4 INJECTION, SOLUTION INTRA-ARTICULAR; INTRALESIONAL; INTRAMUSCULAR; INTRAVENOUS; SOFT TISSUE at 15:29

## 2020-12-21 RX ADMIN — NOREPINEPHRINE BITARTRATE 6.4 MCG: 1 INJECTION, SOLUTION, CONCENTRATE INTRAVENOUS at 16:23

## 2020-12-21 RX ADMIN — Medication 10 MG: at 15:54

## 2020-12-21 RX ADMIN — Medication 5 MG: at 16:36

## 2020-12-21 RX ADMIN — Medication 1 UNITS: at 16:44

## 2020-12-21 RX ADMIN — ONDANSETRON 4 MG: 2 INJECTION INTRAMUSCULAR; INTRAVENOUS at 17:00

## 2020-12-21 RX ADMIN — SODIUM CHLORIDE, POTASSIUM CHLORIDE, SODIUM LACTATE AND CALCIUM CHLORIDE: 600; 310; 30; 20 INJECTION, SOLUTION INTRAVENOUS at 15:22

## 2020-12-21 RX ADMIN — NOREPINEPHRINE BITARTRATE 12.8 MCG: 1 INJECTION, SOLUTION, CONCENTRATE INTRAVENOUS at 16:37

## 2020-12-21 RX ADMIN — LIDOCAINE HYDROCHLORIDE 100 MG: 20 INJECTION, SOLUTION INFILTRATION; PERINEURAL at 15:29

## 2020-12-21 RX ADMIN — SUGAMMADEX 200 MG: 100 INJECTION, SOLUTION INTRAVENOUS at 17:03

## 2020-12-21 RX ADMIN — PROPOFOL 140 MG: 10 INJECTION, EMULSION INTRAVENOUS at 15:29

## 2020-12-21 RX ADMIN — FENTANYL CITRATE 100 MCG: 50 INJECTION, SOLUTION INTRAMUSCULAR; INTRAVENOUS at 15:29

## 2020-12-21 RX ADMIN — NOREPINEPHRINE BITARTRATE 6.4 MCG: 1 INJECTION, SOLUTION, CONCENTRATE INTRAVENOUS at 16:12

## 2020-12-21 RX ADMIN — PHENYLEPHRINE HYDROCHLORIDE 200 MCG: 10 INJECTION INTRAVENOUS at 15:50

## 2020-12-21 RX ADMIN — Medication 1 UNITS: at 17:00

## 2020-12-21 RX ADMIN — LEVOFLOXACIN 500 MG: 5 INJECTION, SOLUTION INTRAVENOUS at 15:36

## 2020-12-21 RX ADMIN — Medication 10 MG: at 16:23

## 2020-12-21 RX ADMIN — PHENYLEPHRINE HYDROCHLORIDE 200 MCG: 10 INJECTION INTRAVENOUS at 16:00

## 2020-12-21 ASSESSMENT — NEW YORK HEART ASSOCIATION (NYHA) CLASSIFICATION: NYHA FUNCTIONAL CLASS: II

## 2020-12-21 ASSESSMENT — COPD QUESTIONNAIRES: COPD: 0

## 2020-12-21 ASSESSMENT — MIFFLIN-ST. JEOR: SCORE: 1920.75

## 2020-12-21 ASSESSMENT — LIFESTYLE VARIABLES: TOBACCO_USE: 1

## 2020-12-21 NOTE — ANESTHESIA CARE TRANSFER NOTE
Patient: Bishop Cardenas    Procedure(s):  ENDOSCOPIC RETROGRADE CHOLANGIOPANCREATOGRAPHY WITH ELECTROHYDRAULIC LITHOTRIPSY USING SPYGLASS, BILIARY BALLOON DILATION, BILIARY STENT PLACEMENT    Diagnosis: Choledocholithiasis [K80.50]  Diagnosis Additional Information: No value filed.    Anesthesia Type:   General     Note:  Airway :Nasal Cannula  Patient transferred to:PACU  Comments: To PACU, VSS, pt awake and alert, exchanging well, report to RN, care accepted.Handoff Report: Identifed the Patient, Identified the Reponsible Provider, Reviewed the pertinent medical history, Discussed the surgical course, Reviewed Intra-OP anesthesia mangement and issues during anesthesia, Set expectations for post-procedure period and Allowed opportunity for questions and acknowledgement of understanding      Vitals: (Last set prior to Anesthesia Care Transfer)    CRNA VITALS  12/21/2020 1641 - 12/21/2020 1716      12/21/2020             Pulse:  71    SpO2:  96 %                Electronically Signed By: JUSTINO Whitlock CRNA  December 21, 2020  5:16 PM

## 2020-12-21 NOTE — ANESTHESIA PREPROCEDURE EVALUATION
"Anesthesia Pre-Procedure Evaluation    Patient: Bishop Cardenas   MRN:     [id number] Gender:   male   Age:    70 year old :      1950        Preoperative Diagnosis: Choledocholithiasis [K80.50]   Procedure(s):  ENDOSCOPIC RETROGRADE CHOLANGIOPANCREATOGRAPHY     LABS:  CBC:   Lab Results   Component Value Date    WBC 8.9 2020    HGB 14.2 2020    HCT 46.3 2020     2020     BMP:   Lab Results   Component Value Date     2020    POTASSIUM 4.0 2020    CHLORIDE 111 (H) 2020    CO2 27 2020    BUN 24 2020    CR 1.68 (H) 2020     (H) 2020     COAGS:   Lab Results   Component Value Date    INR 1.15 (H) 2020     POC:   Lab Results   Component Value Date    BGM 92 2020     OTHER:   Lab Results   Component Value Date    COLIN 9.6 2020    ALBUMIN 3.7 2020    PROTTOTAL 7.9 2020    ALT 23 2020    AST 21 2020    ALKPHOS 143 2020    BILITOTAL 0.7 2020    LIPASE 206 2020    AMYLASE 64 2020        Preop Vitals    BP Readings from Last 3 Encounters:   20 102/69    Pulse Readings from Last 3 Encounters:   20 63      Resp Readings from Last 3 Encounters:   20 18    SpO2 Readings from Last 3 Encounters:   20 98%      Temp Readings from Last 1 Encounters:   20 36.6  C (97.8  F) (Oral)    Ht Readings from Last 1 Encounters:   20 1.88 m (6' 2\")      Wt Readings from Last 1 Encounters:   20 109.1 kg (240 lb 8.4 oz)    Estimated body mass index is 30.88 kg/m  as calculated from the following:    Height as of this encounter: 1.88 m (6' 2\").    Weight as of this encounter: 109.1 kg (240 lb 8.4 oz).     LDA:        Past Medical History:   Diagnosis Date     Chronic schizophrenia (H)      Depression      Gastric reflux      Hypothyroid      Prostate cancer (H)      Sleep apnea      Stented coronary artery       Past Surgical History:   Procedure " Laterality Date     CHOLECYSTECTOMY        Allergies   Allergen Reactions     Wool Fiber      hives        Anesthesia Evaluation     . Pt has had prior anesthetic. Type: General    No history of anesthetic complications          ROS/MED HX    ENT/Pulmonary: Comment: covid 11/1/20, hospitalized, recovered    (+)tobacco use, Past use , . .   (-) asthma and COPD   Neurologic:  - neg neurologic ROS   (+)migraines,    (-) TIA and Parkinson's disease   Cardiovascular:     (+) Dyslipidemia, hypertension--CAD (MI 2013), --stent,. : . CHF etiology: ischemia Last EF: 34 NYHA classification: II. . :. . Previous cardiac testing date:results:Stress Testdate: results:Old infarct, no active ischemiaECG reviewed date:12/10/2020 results:NSR date: results:          METS/Exercise Tolerance:     Hematologic:         Musculoskeletal:   (+) arthritis,  -       GI/Hepatic: Comment: Ascending cholangitis - stented    (+) GERD Other GI/Hepatic gastritis      Renal/Genitourinary: Comment: Overactive bladder    (+) BPH,       Endo:     (+) type II DM thyroid problem hypothyroidism, .      Psychiatric:     (+) psychiatric history schizophrenia, bipolar and depression      Infectious Disease: Comment: COVID POSITIVE 11/1/2020        Malignancy:   (+) Malignancy History of Prostate  Prostate CA Remission status post Radiation,         Other:    (+) No chance of pregnancy C-spine cleared: N/A, no H/O Chronic Pain,no other significant disability   - neg other ROS                     PHYSICAL EXAM:   Mental Status/Neuro: A/A/O   Airway: Facies: Feasible  Mallampati: III  Mouth/Opening: Limited  TM distance: > 6 cm  Neck ROM: Full   Respiratory:   Resp. Rate: Normal     Resp. Effort: Normal      CV:    Comments:      Dental: Normal Dentition                Assessment:   ASA SCORE: 3    H&P: History and physical reviewed and following examination; no interval change.   Smoking Status:  Non-Smoker/Unknown   NPO Status: NPO Appropriate     Plan:    Anes. Type:  General   Pre-Medication: None   Induction:  IV (Standard)   Airway: ETT; Oral   Access/Monitoring: PIV   Maintenance: Balanced     Postop Plan:   Postop Pain: Opioids  Postop Sedation/Airway: Not planned  Disposition: Outpatient     PONV Management:   Adult Risk Factors:, Non-Smoker, Postop Opioids   Prevention: Ondansetron, Dexamethasone     CONSENT: Direct conversation   Plan and risks discussed with: Patient   Blood Products: Consent Deferred (Minimal Blood Loss)                   David Ellis MD

## 2020-12-21 NOTE — BRIEF OP NOTE
[facility] Brief Operative Note    Pre-operative diagnosis: Choledocholithiasis [K80.50]   Post-operative diagnosis Choledocholithiasis    Procedure: Procedure(s):  ENDOSCOPIC RETROGRADE CHOLANGIOPANCREATOGRAPHY WITH ELECTROHYDRAULIC LITHOTRIPSY USING SPYGLASS, BILIARY BALLOON DILATION, BILIARY STENT PLACEMENT   Surgeon: Guru Hema MD   Assistants(s): Missael Hermosillo MD   Estimated blood loss: None    Specimens: None   Findings: - ERCP with multiple stones in filling defect. SpyGlass with EHL performed, with successful stone fragmentation. Stone extraction with basket and balloon. Biliary stent placement.     Recommendations:  - Discharge home with levofloxacin 500 mg PO daily x3-days.    Missael Hermosillo MD on 12/21/2020 at 5:23 PM

## 2020-12-21 NOTE — ANESTHESIA POSTPROCEDURE EVALUATION
Anesthesia POST Procedure Evaluation    Patient: Bishop Cardenas   MRN:     [id number] Gender:   male   Age:    70 year old :      1950        Preoperative Diagnosis: Choledocholithiasis [K80.50]   Procedure(s):  ENDOSCOPIC RETROGRADE CHOLANGIOPANCREATOGRAPHY WITH ELECTROHYDRAULIC LITHOTRIPSY USING SPYGLASS, BILIARY BALLOON DILATION, BILIARY STENT PLACEMENT   Postop Comments: No value filed.     Anesthesia Type: General       Disposition: Outpatient   Postop Pain Control: Uneventful            Sign Out: Well controlled pain   PONV: No   Neuro/Psych: Uneventful            Sign Out: Acceptable/Baseline neuro status   Airway/Respiratory: Uneventful            Sign Out: Acceptable/Baseline resp. status   CV/Hemodynamics: Uneventful            Sign Out: Acceptable CV status   Other NRE: NONE   DID A NON-ROUTINE EVENT OCCUR? No    Event details/Postop Comments:  Patient with minimal blanching around IV site, but adequate signs of perfusion. IV free flows, patient denies complaints of pain, numbness, tingling, weakness. No signs of deficit of motor or sensory on physical exam. Plan to remove IV, OK for discharge home         Last Anesthesia Record Vitals:  CRNA VITALS  2020 1641 - 2020 1741      2020             NIBP:  118/69    Ht Rate:  63    SpO2:  99 %    Resp Rate (observed):  24    EKG:  Sinus rhythm          Last PACU Vitals:  Vitals Value Taken Time   /62 20 1745   Temp 35.9  C (96.62  F) 20 1748   Pulse 59 20 1749   Resp 24 20 1745   SpO2 96 % 20 1757   Temp src     NIBP 118/69 20 1716   Pulse     SpO2 99 % 20 1716   Resp     Temp     Ht Rate 63 20 1716   Temp 2     Vitals shown include unvalidated device data.      Electronically Signed By: David Jahveri MD, 2020, 5:59 PM

## 2020-12-21 NOTE — ANESTHESIA PROCEDURE NOTES
Airway   Date/Time: 12/21/2020 3:44 PM   Patient location during procedure: OR    Staff -   Resident/Fellow: Bassem Barclay MD  Performed By: resident    Consent for Airway   Urgency: elective    Indications and Patient Condition  Indications for airway management: janine-procedural  Induction type:intravenousMask difficulty assessment: 2 - vent by mask + OA or adjuvant +/- NMBA    Final Airway Details  Final airway type: endotracheal airway  Successful airway:ETT - single  Endotracheal Airway Details   ETT size (mm): 7.5  Cuffed: yes  Cuff volume (mL): 10  Successful intubation technique: video laryngoscopy  Grade View of Cords: 1  Adjucts: stylet  Measured from: lips  Secured at (cm): 24  Secured with: pink tape  Bite block used: Oral Airway    Post intubation assessment   Placement verified by: capnometry, equal breath sounds and chest rise   Number of attempts at approach: 1  Number of other approaches attempted: 0  Secured with:pink tape  Ease of procedure: easy  Dentition: Intact and UnchangedAdditional Comments  Intubation performed by resident, Renzo Barclay.

## 2020-12-22 LAB — ERCP: NORMAL

## 2020-12-22 NOTE — OR NURSING
Discharge instructions reviewed with patient and responsible adult. All questions regarding this information answered at this time. AVS papers printed and given to patient to give to responsible adult. Patient and responsible adult verbalized understanding of discharge instructions and understand where to find the phone number to call with any questions or concerns.       Dr. Hermosillo does not need to see the patient again.

## 2020-12-22 NOTE — DISCHARGE INSTRUCTIONS
[facility]  Same-Day Surgery   Adult Discharge Orders & Instructions     For 24 hours after surgery    1. Get plenty of rest.  A responsible adult must stay with you for at least 24 hours after you leave the hospital.   2. Do not drive or use heavy equipment.  If you have weakness or tingling, don't drive or use heavy equipment until this feeling goes away.  3. Do not drink alcohol.  4. Avoid strenuous or risky activities.  Ask for help when climbing stairs.   5. You may feel lightheaded.  IF so, sit for a few minutes before standing.  Have someone help you get up.   6. If you have nausea (feel sick to your stomach): Drink only clear liquids such as apple juice, ginger ale, broth or 7-Up.  Rest may also help.  Be sure to drink enough fluids.  Move to a regular diet as you feel able.  7. You may have a slight fever. Call the doctor if your fever is over 100 F (37.7 C) (taken under the tongue) or lasts longer than 24 hours.  8. You may have a dry mouth, a sore throat, muscle aches or trouble sleeping.  These should go away after 24 hours.  9. Do not make important or legal decisions.   Call your doctor for any of the followin.  Signs of infection (fever, growing tenderness at the surgery site, a large amount of drainage or bleeding, severe pain, foul-smelling drainage, redness, swelling).    2. It has been over 8 to 10 hours since surgery and you are still not able to urinate (pass water).    3.  Headache for over 24 hours.    To contact a doctor, call Dr. Guru Garrido @ [phone] (GI Clinic) or:        [phone] and ask for the resident on call for gastroenterology  (answered 24 hours a day)      Emergency Department:[facility]: [phone]       (TTY for hearing impaired: [phone])    Dr. Guru Garrido' nurse coordinator will call and scheduled x-ray to be done in the area where you live.

## 2020-12-22 NOTE — OR NURSING
Call made to home staff about hospital visit. Staff said he was doing very well with no concerns at this time.

## 2020-12-30 ENCOUNTER — PATIENT OUTREACH (OUTPATIENT)
Dept: GASTROENTEROLOGY | Facility: CLINIC | Age: 70
End: 2020-12-30

## 2020-12-30 DIAGNOSIS — Z46.89 ENCOUNTER FOR REMOVAL OF BILIARY STENT: Primary | ICD-10-CM

## 2020-12-30 NOTE — TELEPHONE ENCOUNTER
Post ERCP (12/21/2020) with Dr. Garrido: Follow-up    Post procedure recommendations:   Schedule KUB near home in 4-weeks to assess for  biliary stent retention vs spontaneous ejection.     - If biliary stent remains, schedule EGD with biliary  stent extraction with Dr. Garrido in Endoscopy Unit.     Orders placed: KUB, 4 weeks    Patient states: called caregiver [facility] in [place], [place]  [phone]  Fax [phone]    Clinic contact and scheduling numbers verified for future questions/concerns.    Elana Beavers, BRIANNA Care Coordinator

## 2020-12-31 ENCOUNTER — DOCUMENTATION ONLY (OUTPATIENT)
Dept: GASTROENTEROLOGY | Facility: CLINIC | Age: 70
End: 2020-12-31

## 2020-12-31 NOTE — PROGRESS NOTES
Orders for xray faxed to    [facility] in [place], [place]   [phone]   Fax [phone]     Lane Lantigua LPN

## 2021-02-01 ENCOUNTER — PATIENT OUTREACH (OUTPATIENT)
Dept: GASTROENTEROLOGY | Facility: CLINIC | Age: 71
End: 2021-02-01

## 2021-02-01 NOTE — TELEPHONE ENCOUNTER
Called group home to f up on xray. Advised orders we faxed on 12/31, asked that Jewels call to schedule xray. She said she would. Will follow.    ML

## 2021-02-02 NOTE — TELEPHONE ENCOUNTER
Pts group home called back, asked that we send orders for xray to [phone]  Called Jewels to update we have new # and will fax orders.     ML

## 2021-02-03 ENCOUNTER — DOCUMENTATION ONLY (OUTPATIENT)
Dept: GASTROENTEROLOGY | Facility: CLINIC | Age: 71
End: 2021-02-03

## 2021-02-10 ENCOUNTER — TELEPHONE (OUTPATIENT)
Dept: GASTROENTEROLOGY | Facility: CLINIC | Age: 71
End: 2021-02-10

## 2021-02-10 NOTE — TELEPHONE ENCOUNTER
Called to follow up on xray order and see when Pt is scheduled for xray. Per Jewels, Pt scheduled on 2/11/21 at [facility].    Lane Lantigua LPN

## 2021-02-11 ENCOUNTER — TRANSFERRED RECORDS (OUTPATIENT)
Dept: HEALTH INFORMATION MANAGEMENT | Facility: CLINIC | Age: 71
End: 2021-02-11

## 2021-02-16 ENCOUNTER — DOCUMENTATION ONLY (OUTPATIENT)
Dept: GASTROENTEROLOGY | Facility: CLINIC | Age: 71
End: 2021-02-16

## 2021-02-16 NOTE — PROGRESS NOTES
Called to obtain xray report and images. DOS: 2/11/21.    [facility] in [place], [place]   [phone]   Fax [phone]     Images will be pushed and report will be faxed.    Lane Lantigua LPN

## 2021-02-18 ENCOUNTER — TELEPHONE (OUTPATIENT)
Dept: GASTROENTEROLOGY | Facility: OUTPATIENT CENTER | Age: 71
End: 2021-02-18

## 2021-02-18 ENCOUNTER — TELEPHONE (OUTPATIENT)
Dept: GASTROENTEROLOGY | Facility: CLINIC | Age: 71
End: 2021-02-18

## 2021-02-18 DIAGNOSIS — Z46.89 ENCOUNTER FOR REMOVAL OF BILIARY STENT: Primary | ICD-10-CM

## 2021-02-18 DIAGNOSIS — Z11.59 ENCOUNTER FOR SCREENING FOR OTHER VIRAL DISEASES: ICD-10-CM

## 2021-02-18 NOTE — TELEPHONE ENCOUNTER
Jewels called back and would like to schedule EGD on Thursday (March 3rd if available) and preferably in the PM, as the live 4 hours away.    Will put in order for EGD. Reviewed about COVID test.    Lane Lantigua LPN

## 2021-02-18 NOTE — TELEPHONE ENCOUNTER
"Per Dr. Floyd,  \"Biliary stent is present. Please schedule EGD for stent removal in [address]\"    Called and spoke to Jewels (Pt's caretaker) about scheduling EGD. Per Jewels, she is just about to leave from one house to another and will call me back after she looks at her schedule.    Lane Lantigua, JESSICA    "

## 2021-02-18 NOTE — TELEPHONE ENCOUNTER
Patient is scheduled for EGD with Dr. THIBODEAUX    Spoke with: PATIENT'S CARE TAKER    Date of Procedure: 3/4/2021    Location: UNIT [facility]    Sedation Type MAC    Pre-op for [facility] MAC and OR: Y- PT'S CARE TAKER IS AWARE    (if yes advise patient they will need a pre-op prior to procedure)      Is patient on blood thinners? -N (If yes- inform patient to follow up with PCP or provider for follow up instructions)     Informed patient they will need an adult  Y    Informed Patient of COVID Test Requirement Y- THEY ARE AWARE, THEY LIVE FAR AWAY WILL BE DOING AT OUTSIDE FACILITY    Preferred Pharmacy for Pre Prescription N/A    Confirmed Nurse will call to complete assessment N    Additional comments: N/A

## 2021-02-19 ENCOUNTER — DOCUMENTATION ONLY (OUTPATIENT)
Dept: GASTROENTEROLOGY | Facility: CLINIC | Age: 71
End: 2021-02-19

## 2021-03-02 ENCOUNTER — TRANSFERRED RECORDS (OUTPATIENT)
Dept: HEALTH INFORMATION MANAGEMENT | Facility: CLINIC | Age: 71
End: 2021-03-02

## 2021-03-04 ENCOUNTER — HOSPITAL ENCOUNTER (OUTPATIENT)
Facility: CLINIC | Age: 71
Discharge: HOME | End: 2021-03-04
Attending: INTERNAL MEDICINE | Admitting: INTERNAL MEDICINE
Payer: MEDICARE

## 2021-03-04 ENCOUNTER — ANESTHESIA (OUTPATIENT)
Dept: GASTROENTEROLOGY | Facility: CLINIC | Age: 71
End: 2021-03-04
Payer: MEDICARE

## 2021-03-04 ENCOUNTER — ANESTHESIA EVENT (OUTPATIENT)
Dept: GASTROENTEROLOGY | Facility: CLINIC | Age: 71
End: 2021-03-04
Payer: MEDICARE

## 2021-03-04 VITALS
OXYGEN SATURATION: 97 % | DIASTOLIC BLOOD PRESSURE: 77 MMHG | SYSTOLIC BLOOD PRESSURE: 124 MMHG | HEART RATE: 55 BPM | RESPIRATION RATE: 15 BRPM | WEIGHT: 242.73 LBS | TEMPERATURE: 98.3 F | BODY MASS INDEX: 32.88 KG/M2 | HEIGHT: 72 IN

## 2021-03-04 DIAGNOSIS — Z11.59 ENCOUNTER FOR SCREENING FOR OTHER VIRAL DISEASES: ICD-10-CM

## 2021-03-04 LAB
LABORATORY COMMENT REPORT: NORMAL
SARS-COV-2 RNA RESP QL NAA+PROBE: NEGATIVE
SPECIMEN SOURCE: NORMAL
UPPER GI ENDOSCOPY: NORMAL

## 2021-03-04 PROCEDURE — U0003 INFECTIOUS AGENT DETECTION BY NUCLEIC ACID (DNA OR RNA); SEVERE ACUTE RESPIRATORY SYNDROME CORONAVIRUS 2 (SARS-COV-2) (CORONAVIRUS DISEASE [COVID-19]), AMPLIFIED PROBE TECHNIQUE, MAKING USE OF HIGH THROUGHPUT TECHNOLOGIES AS DESCRIBED BY CMS-2020-01-R: HCPCS | Performed by: INTERNAL MEDICINE

## 2021-03-04 PROCEDURE — 370N000017 HC ANESTHESIA TECHNICAL FEE, PER MIN: Performed by: INTERNAL MEDICINE

## 2021-03-04 PROCEDURE — 250N000009 HC RX 250: Performed by: NURSE ANESTHETIST, CERTIFIED REGISTERED

## 2021-03-04 PROCEDURE — 250N000013 HC RX MED GY IP 250 OP 250 PS 637: Mod: GY | Performed by: INTERNAL MEDICINE

## 2021-03-04 PROCEDURE — 43247 EGD REMOVE FOREIGN BODY: CPT | Performed by: INTERNAL MEDICINE

## 2021-03-04 PROCEDURE — U0005 INFEC AGEN DETEC AMPLI PROBE: HCPCS | Performed by: INTERNAL MEDICINE

## 2021-03-04 PROCEDURE — 250N000011 HC RX IP 250 OP 636: Performed by: NURSE ANESTHETIST, CERTIFIED REGISTERED

## 2021-03-04 PROCEDURE — 258N000003 HC RX IP 258 OP 636: Performed by: NURSE ANESTHETIST, CERTIFIED REGISTERED

## 2021-03-04 RX ORDER — GLYCOPYRROLATE 0.2 MG/ML
INJECTION, SOLUTION INTRAMUSCULAR; INTRAVENOUS PRN
Status: DISCONTINUED | OUTPATIENT
Start: 2021-03-04 | End: 2021-03-04

## 2021-03-04 RX ORDER — MEPERIDINE HYDROCHLORIDE 25 MG/ML
12.5 INJECTION INTRAMUSCULAR; INTRAVENOUS; SUBCUTANEOUS
Status: CANCELLED | OUTPATIENT
Start: 2021-03-04

## 2021-03-04 RX ORDER — ONDANSETRON 2 MG/ML
4 INJECTION INTRAMUSCULAR; INTRAVENOUS EVERY 30 MIN PRN
Status: CANCELLED | OUTPATIENT
Start: 2021-03-04

## 2021-03-04 RX ORDER — ONDANSETRON 4 MG/1
4 TABLET, ORALLY DISINTEGRATING ORAL EVERY 30 MIN PRN
Status: CANCELLED | OUTPATIENT
Start: 2021-03-04

## 2021-03-04 RX ORDER — SODIUM CHLORIDE, SODIUM LACTATE, POTASSIUM CHLORIDE, CALCIUM CHLORIDE 600; 310; 30; 20 MG/100ML; MG/100ML; MG/100ML; MG/100ML
INJECTION, SOLUTION INTRAVENOUS CONTINUOUS
Status: CANCELLED | OUTPATIENT
Start: 2021-03-04

## 2021-03-04 RX ORDER — HYDROMORPHONE HYDROCHLORIDE 1 MG/ML
.3-.5 INJECTION, SOLUTION INTRAMUSCULAR; INTRAVENOUS; SUBCUTANEOUS EVERY 10 MIN PRN
Status: CANCELLED | OUTPATIENT
Start: 2021-03-04

## 2021-03-04 RX ORDER — NALOXONE HYDROCHLORIDE 0.4 MG/ML
0.2 INJECTION, SOLUTION INTRAMUSCULAR; INTRAVENOUS; SUBCUTANEOUS
Status: CANCELLED | OUTPATIENT
Start: 2021-03-04 | End: 2021-03-05

## 2021-03-04 RX ORDER — SODIUM CHLORIDE, SODIUM LACTATE, POTASSIUM CHLORIDE, CALCIUM CHLORIDE 600; 310; 30; 20 MG/100ML; MG/100ML; MG/100ML; MG/100ML
INJECTION, SOLUTION INTRAVENOUS CONTINUOUS PRN
Status: DISCONTINUED | OUTPATIENT
Start: 2021-03-04 | End: 2021-03-04

## 2021-03-04 RX ORDER — SIMETHICONE
LIQUID (ML) MISCELLANEOUS PRN
Status: DISCONTINUED | OUTPATIENT
Start: 2021-03-04 | End: 2021-03-04 | Stop reason: HOSPADM

## 2021-03-04 RX ORDER — PROPOFOL 10 MG/ML
INJECTION, EMULSION INTRAVENOUS PRN
Status: DISCONTINUED | OUTPATIENT
Start: 2021-03-04 | End: 2021-03-04

## 2021-03-04 RX ORDER — NALOXONE HYDROCHLORIDE 0.4 MG/ML
0.4 INJECTION, SOLUTION INTRAMUSCULAR; INTRAVENOUS; SUBCUTANEOUS
Status: CANCELLED | OUTPATIENT
Start: 2021-03-04 | End: 2021-03-05

## 2021-03-04 RX ORDER — PROPOFOL 10 MG/ML
INJECTION, EMULSION INTRAVENOUS CONTINUOUS PRN
Status: DISCONTINUED | OUTPATIENT
Start: 2021-03-04 | End: 2021-03-04

## 2021-03-04 RX ORDER — FENTANYL CITRATE 50 UG/ML
25-50 INJECTION, SOLUTION INTRAMUSCULAR; INTRAVENOUS EVERY 5 MIN PRN
Status: CANCELLED | OUTPATIENT
Start: 2021-03-04

## 2021-03-04 RX ADMIN — PROPOFOL 10 MG: 10 INJECTION, EMULSION INTRAVENOUS at 14:31

## 2021-03-04 RX ADMIN — PROPOFOL 10 MG: 10 INJECTION, EMULSION INTRAVENOUS at 14:33

## 2021-03-04 RX ADMIN — SODIUM CHLORIDE, POTASSIUM CHLORIDE, SODIUM LACTATE AND CALCIUM CHLORIDE: 600; 310; 30; 20 INJECTION, SOLUTION INTRAVENOUS at 14:19

## 2021-03-04 RX ADMIN — PROPOFOL 125 MCG/KG/MIN: 10 INJECTION, EMULSION INTRAVENOUS at 14:25

## 2021-03-04 RX ADMIN — GLYCOPYRROLATE 0.2 MG: 0.2 INJECTION, SOLUTION INTRAMUSCULAR; INTRAVENOUS at 14:31

## 2021-03-04 RX ADMIN — TOPICAL ANESTHETIC 1 EACH: 200 SPRAY DENTAL; PERIODONTAL at 14:25

## 2021-03-04 RX ADMIN — PROPOFOL 10 MG: 10 INJECTION, EMULSION INTRAVENOUS at 14:32

## 2021-03-04 ASSESSMENT — MIFFLIN-ST. JEOR: SCORE: 1894

## 2021-03-04 ASSESSMENT — LIFESTYLE VARIABLES: TOBACCO_USE: 1

## 2021-03-04 NOTE — ANESTHESIA POSTPROCEDURE EVALUATION
Patient: Bishop Cardenas    Procedure(s):  ESOPHAGOGASTRODUODENOSCOPY, WITH FOREIGN BODY REMOVAL    Diagnosis:Encounter for removal of biliary stent [Z46.89]  Diagnosis Additional Information: No value filed.    Anesthesia Type:  MAC    Note:  Disposition: Outpatient   Postop Pain Control: Uneventful            Sign Out: Well controlled pain   PONV: No   Neuro/Psych: Uneventful            Sign Out: Acceptable/Baseline neuro status   Airway/Respiratory: Uneventful            Sign Out: Acceptable/Baseline resp. status   CV/Hemodynamics: Uneventful            Sign Out: Acceptable CV status   Other NRE: NONE   DID A NON-ROUTINE EVENT OCCUR? No         Last vitals:  Vitals:    03/04/21 1113 03/04/21 1450   BP: 111/67 105/68   Pulse: 51 (!) 45   Resp: 15    Temp: 36.8  C (98.3  F)    SpO2: 97% 97%       Last vitals prior to Anesthesia Care Transfer:  CRNA VITALS  3/4/2021 1416 - 3/4/2021 1516      3/4/2021             Resp Rate (observed):  (!) 3          Electronically Signed By: Leonard Lawrence MD  March 4, 2021  3:17 PM

## 2021-03-04 NOTE — ANESTHESIA CARE TRANSFER NOTE
Patient: Bishop Cardenas    Procedure(s):  ESOPHAGOGASTRODUODENOSCOPY (EGD)    Diagnosis: Encounter for removal of biliary stent [Z46.89]  Diagnosis Additional Information: No value filed.    Anesthesia Type:   MAC     Note:    Oropharynx: oropharynx clear of all foreign objects and spontaneously breathing  Level of Consciousness: awake  Oxygen Supplementation: nasal cannula  Level of Supplemental Oxygen (L/min / FiO2): 2  Independent Airway: airway patency satisfactory and stable  Dentition: dentition unchanged  Vital Signs Stable: post-procedure vital signs reviewed and stable  Report to RN Given: handoff report given  Patient transferred to: Phase II  Comments: Patient awake and talking. Transferred to phase 2. Report to RN. Vital signs stable.   Handoff Report: Identifed the Patient, Identified the Reponsible Provider, Reviewed the pertinent medical history, Discussed the surgical course, Reviewed Intra-OP anesthesia mangement and issues during anesthesia, Set expectations for post-procedure period and Allowed opportunity for questions and acknowledgement of understanding      Vitals: (Last set prior to Anesthesia Care Transfer)  CRNA VITALS  3/4/2021 1416 - 3/4/2021 1450      3/4/2021             Resp Rate (observed):  SpO2: 15  97%        Electronically Signed By: JUSTINO Cates CRNA  March 4, 2021  2:50 PM

## 2021-03-04 NOTE — ANESTHESIA PREPROCEDURE EVALUATION
Anesthesia Pre-Procedure Evaluation    Patient: Bishop Cardenas   MRN: [id number] : 1950        Preoperative Diagnosis: Encounter for removal of biliary stent [Z46.89]   Procedure : Procedure(s):  ESOPHAGOGASTRODUODENOSCOPY (EGD)     Past Medical History:   Diagnosis Date     Chronic schizophrenia (H)      Depression      Gastric reflux      Hypothyroid      Prostate cancer (H)      Sleep apnea      Stented coronary artery       Past Surgical History:   Procedure Laterality Date     CHOLECYSTECTOMY       ENDOSCOPIC RETROGRADE CHOLANGIOPANCREATOGRAM N/A 2020    Procedure: ENDOSCOPIC RETROGRADE CHOLANGIOPANCREATOGRAPHY WITH ELECTROHYDRAULIC LITHOTRIPSY USING SPYGLASS, BILIARY BALLOON DILATION, BILIARY STENT PLACEMENT;  Surgeon: Guru Stephy Garrido MD;  Location: [facility] OR      Allergies   Allergen Reactions     Wool Fiber      hives      Social History     Tobacco Use     Smoking status: Former Smoker     Smokeless tobacco: Never Used   Substance Use Topics     Alcohol use: Never     Frequency: Never      Wt Readings from Last 1 Encounters:   20 109.1 kg (240 lb 8.4 oz)        Anesthesia Evaluation   Pt has had prior anesthetic. Type: General.    No history of anesthetic complications       ROS/MED HX  ENT/Pulmonary:     (+) sleep apnea, doesn't use CPAP, tobacco use, Past use,     Neurologic:       Cardiovascular:     (+) Dyslipidemia hypertension--CAD -past MI () -stent-Previous cardiac testing (Last EF 34)     METS/Exercise Tolerance:     Hematologic:  - neg hematologic  ROS     Musculoskeletal:   (+) arthritis,     GI/Hepatic: Comment: Ascending cholangitis s/p stenting in 2020    (+) GERD,     Renal/Genitourinary:       Endo:     (+) thyroid problem, hypothyroidism,     Psychiatric/Substance Use:     (+) psychiatric history schizophrenia     Infectious Disease:       Malignancy:   (+) Malignancy, History of Prostate.Prostate CA Remission status post Radiation.         Other:            Physical Exam    Airway        Mallampati: II   TM distance: > 3 FB   Neck ROM: limited   Mouth opening: > 3 cm    Respiratory Devices and Support         Dental  no notable dental history   Comment: Generally poor dentition    (+) other      Cardiovascular   cardiovascular exam normal          Pulmonary   pulmonary exam normal                OUTSIDE LABS:  CBC:   Lab Results   Component Value Date    WBC 8.9 12/21/2020    HGB 14.2 12/21/2020    HCT 46.3 12/21/2020     12/21/2020     BMP:   Lab Results   Component Value Date     12/21/2020    POTASSIUM 4.0 12/21/2020    CHLORIDE 111 (H) 12/21/2020    CO2 27 12/21/2020    BUN 24 12/21/2020    CR 1.68 (H) 12/21/2020     (H) 12/21/2020     COAGS:   Lab Results   Component Value Date    INR 1.15 (H) 12/21/2020     POC:   Lab Results   Component Value Date    BGM 92 12/21/2020     HEPATIC:   Lab Results   Component Value Date    ALBUMIN 3.7 12/21/2020    PROTTOTAL 7.9 12/21/2020    ALT 23 12/21/2020    AST 21 12/21/2020    ALKPHOS 143 12/21/2020    BILITOTAL 0.7 12/21/2020     OTHER:   Lab Results   Component Value Date    OCLIN 9.6 12/21/2020    LIPASE 206 12/21/2020    AMYLASE 64 12/21/2020       Anesthesia Plan    ASA Status:  3   NPO Status:  NPO Appropriate    Anesthesia Type: MAC.     - Reason for MAC: straight local not clinically adequate, immobility needed, chronic cardiopulmonary disease   Induction: Intravenous, Propofol.   Maintenance: TIVA.        Consents    Anesthesia Plan(s) and associated risks, benefits, and realistic alternatives discussed. Questions answered and patient/representative(s) expressed understanding.     - Discussed with:  Patient      - Extended Intubation/Ventilatory Support Discussed: No.      - Patient is DNR/DNI Status: No    Use of blood products discussed: No .     Postoperative Care            Comments:         H&P reviewed: Unable to attach H&P to encounter due to EHR limitations. H&P Update:  appropriate H&P reviewed, patient examined. No interval changes since H&P (within 30 days).         Leonard Lawrence MD

## 2023-03-23 NOTE — TELEPHONE ENCOUNTER
Jewels from patient's group home called back to get patient rescheduled. Informed Jewels the patient is rescheduled with Dr. Floyd on 11/16/2020 per her request. Informed Jewels patient will need an updated COVID test within 96-72 hours of procedure date. She stated patient will have this done locally with PCP. Asked that orders be faxed to PCP. Informed Jewels patient will need a  and someone to monitor him for 24 hours after the procedure.       n/a

## (undated) DEVICE — INFLATION DEVICE BIG 60 ENDO-AN6012

## (undated) DEVICE — SUCTION MANIFOLD DORNOCH ULTRA CART UL-CL500

## (undated) DEVICE — BIOPSY VALVE BIOSHIELD 00711135

## (undated) DEVICE — ENDO BITE BLOCK ADULT OMNI-BLOC

## (undated) DEVICE — CATH RETRIEVAL BALLOON EXTRACTOR PRO RX-S INJ ABOVE 9-12MM

## (undated) DEVICE — INTR ENDOSCOPIC STENT FUSION OASIS 09.0FRX200CM

## (undated) DEVICE — WIRE GUIDE 0.025"X270CM STR VISIGLIDE G-240-2527S

## (undated) DEVICE — ENDO BASKET RETRIEVAL TRAPEZOID WIREGUIDED  2.5CM M00510880

## (undated) DEVICE — ENDO TUBING CO2 SMARTCAP STERILE DISP 100145CO2EXT

## (undated) DEVICE — ENDO CATH BALLOON DILATION HURRICANE 08MMX4X180CM M00545940

## (undated) DEVICE — KIT ENDO FIRST STEP DISINFECTANT 200ML W/POUCH EP-4

## (undated) DEVICE — PROBE LITHOTRIPTOR 1.9MM FOR SPYGLASS 9-195-371DS

## (undated) DEVICE — CATH SPYGLASS DS 2 DELIVERY & ACCESS M00546610

## (undated) DEVICE — ENDO FUSION OMNI-TOME G31903

## (undated) DEVICE — ENDO SNARE POLYPECTOMY OVAL 15MM LOOP SD-240U-15

## (undated) DEVICE — SOL WATER IRRIG 1000ML BOTTLE 2F7114

## (undated) DEVICE — PACK ENDOSCOPY GI CUSTOM UMMC

## (undated) RX ORDER — ONDANSETRON 2 MG/ML
INJECTION INTRAMUSCULAR; INTRAVENOUS
Status: DISPENSED
Start: 2020-12-21

## (undated) RX ORDER — EPHEDRINE SULFATE 50 MG/ML
INJECTION, SOLUTION INTRAMUSCULAR; INTRAVENOUS; SUBCUTANEOUS
Status: DISPENSED
Start: 2020-12-21

## (undated) RX ORDER — FENTANYL CITRATE 50 UG/ML
INJECTION, SOLUTION INTRAMUSCULAR; INTRAVENOUS
Status: DISPENSED
Start: 2020-12-21